# Patient Record
Sex: FEMALE | Race: WHITE | Employment: FULL TIME | URBAN - METROPOLITAN AREA
[De-identification: names, ages, dates, MRNs, and addresses within clinical notes are randomized per-mention and may not be internally consistent; named-entity substitution may affect disease eponyms.]

---

## 2020-11-15 ENCOUNTER — HOSPITAL ENCOUNTER (EMERGENCY)
Facility: CLINIC | Age: 20
Discharge: HOME | End: 2020-11-15
Attending: EMERGENCY MEDICINE | Admitting: EMERGENCY MEDICINE
Payer: COMMERCIAL

## 2020-11-15 VITALS
DIASTOLIC BLOOD PRESSURE: 66 MMHG | HEIGHT: 67 IN | WEIGHT: 120 LBS | HEART RATE: 87 BPM | TEMPERATURE: 97.3 F | RESPIRATION RATE: 14 BRPM | SYSTOLIC BLOOD PRESSURE: 111 MMHG | OXYGEN SATURATION: 98 % | BODY MASS INDEX: 18.83 KG/M2

## 2020-11-15 DIAGNOSIS — T78.40XA ALLERGIC REACTION, INITIAL ENCOUNTER: ICD-10-CM

## 2020-11-15 PROCEDURE — 250N000009 HC RX 250

## 2020-11-15 PROCEDURE — 250N000011 HC RX IP 250 OP 636

## 2020-11-15 PROCEDURE — 96374 THER/PROPH/DIAG INJ IV PUSH: CPT

## 2020-11-15 PROCEDURE — 250N000009 HC RX 250: Performed by: EMERGENCY MEDICINE

## 2020-11-15 PROCEDURE — 99284 EMERGENCY DEPT VISIT MOD MDM: CPT | Mod: 25

## 2020-11-15 RX ORDER — DIPHENHYDRAMINE HYDROCHLORIDE 50 MG/ML
INJECTION INTRAMUSCULAR; INTRAVENOUS
Status: COMPLETED
Start: 2020-11-15 | End: 2020-11-15

## 2020-11-15 RX ORDER — PREDNISONE 20 MG/1
TABLET ORAL
Qty: 10 TABLET | Refills: 0 | Status: SHIPPED | OUTPATIENT
Start: 2020-11-15

## 2020-11-15 RX ORDER — METHYLPREDNISOLONE SODIUM SUCCINATE 125 MG/2ML
INJECTION, POWDER, LYOPHILIZED, FOR SOLUTION INTRAMUSCULAR; INTRAVENOUS
Status: COMPLETED
Start: 2020-11-15 | End: 2020-11-15

## 2020-11-15 RX ADMIN — METHYLPREDNISOLONE SODIUM SUCCINATE 125 MG: 125 INJECTION, POWDER, FOR SOLUTION INTRAMUSCULAR; INTRAVENOUS at 20:09

## 2020-11-15 RX ADMIN — DIPHENHYDRAMINE HYDROCHLORIDE 25 MG: 50 INJECTION, SOLUTION INTRAMUSCULAR; INTRAVENOUS at 20:02

## 2020-11-15 RX ADMIN — FAMOTIDINE 20 MG: 10 INJECTION INTRAVENOUS at 20:16

## 2020-11-15 RX ADMIN — EPINEPHRINE 1 MG: 1 INJECTION INTRAMUSCULAR; INTRAVENOUS; SUBCUTANEOUS at 19:55

## 2020-11-15 ASSESSMENT — ENCOUNTER SYMPTOMS
SORE THROAT: 1
FEVER: 0
FACIAL SWELLING: 1
VOMITING: 1
COUGH: 0
CHILLS: 0
TROUBLE SWALLOWING: 1
NAUSEA: 1

## 2020-11-15 ASSESSMENT — MIFFLIN-ST. JEOR: SCORE: 1336.95

## 2020-11-15 NOTE — ED AVS SNAPSHOT
[facility] Emergency Dept  [address] [place] [address]  Phone: [phone]  Fax: [phone]                                    Kaitlyn Arroyo   MRN: [id number]    Department: [facility] Emergency Dept   Date of Visit: 11/15/2020           After Visit Summary Signature Page    I have received my discharge instructions, and my questions have been answered. I have discussed any challenges I see with this plan with the nurse or doctor.    ..........................................................................................................................................  Patient/Patient Representative Signature      ..........................................................................................................................................  Patient Representative Print Name and Relationship to Patient    ..................................................               ................................................  Date                                   Time    ..........................................................................................................................................  Reviewed by Signature/Title    ...................................................              ..............................................  Date                                               Time          22EPIC Rev 08/18

## 2020-11-16 NOTE — DISCHARGE INSTRUCTIONS
Please come back to emergency room immediately if you have any worsening swelling or signs of an allergic reaction.  Please do follow with your regular doctor as above.

## 2020-11-16 NOTE — ED TRIAGE NOTES
Patient states ate at an Asian restaurant.  After had throat tightness, lip swelling.  Vomited once.  Peanut allergy.    Mom gave her Children's Benadryl (oral solution).

## 2020-11-16 NOTE — ED PROVIDER NOTES
History     Chief Complaint:  Allergic Reaction     The history is provided by the patient.     Kaitlyn Arroyo is a 20 year old year old female with a known peanut allergy who presents with her boyfriend for evaluation of an allergic reaction. About 30 minutes prior to arrival, the patient developed throat tightness and itchiness in addition to lip swelling after eating Asian takeout food believed to contain peanuts from [employer]. Patient reports one episode of emesis. She states she Children's Benadryl after the onset of her symptoms, though is uncertain on the amount. Patient notes she has needed to receive epinephrine two other times in the past. Patient denies any fever, chills, cough, or any other symptoms.    Allergies:  Peanuts [Nuts]    Medications:   Epinephrine    Medical History:   The patient denies any significant past medical history.    Surgical History   The patient does not have any pertinent past surgical history.    Family History:   No past pertinent family history.    Social History:  Smoking status: Negative  Alcohol use: Positive  Drug use: Negative  Marital Status: Single  Presents to the ED with her boyfriend.   PCP: Ludy Paula     Review of Systems   Constitutional: Negative for chills and fever.   HENT: Positive for facial swelling (lips), sore throat (tightness) and trouble swallowing.    Respiratory: Negative for cough.    Gastrointestinal: Positive for nausea and vomiting.   All other systems reviewed and are negative.      Physical Exam     Patient Vitals for the past 24 hrs:   BP Temp Temp src Pulse Resp SpO2 Height Weight   11/15/20 2200 -- -- -- -- -- 98 % -- --   11/15/20 2145 -- -- -- -- -- 97 % -- --   11/15/20 2130 -- -- -- 87 -- 96 % -- --   11/15/20 2115 -- -- -- 89 -- 96 % -- --   11/15/20 2100 117/68 -- -- 86 -- 97 % -- --   11/15/20 2045 116/65 -- -- 89 -- 100 % -- --   11/15/20 2030 111/68 -- -- 89 -- 100 % -- --   11/15/20 1957 (!) 131/93 97.3  F (36.3  C)  "Temporal 97 14 100 % 1.702 m (5' 7\") 54.4 kg (120 lb)   11/15/20 1955 -- 97.6  F (36.4  C) Temporal 98 20 100 % -- --       Physical Exam  Vitals: reviewed by me  General: Pt seen on hospital [facility], pleasant, cooperative, and alert to conversation  Eyes: Tracking well, clear conjunctiva BL  ENT: MMM, midline trachea.  Slight swelling noted to the upper lip, oropharynx unremarkable, midline uvula, nonswollen.  No lesions noted.  No stridor, no evidence of airway impingement.  Airway appears widely patent.  Lungs:  No tachypnea, no accessory muscle use. No respiratory distress.   CV: Rate as above, regular rhythm.    Abd: Soft, non tender, no guarding, no rebound. Non distended  MSK: no peripheral edema or joint effusion.  No evidence of trauma  Skin: No rash, normal turgor and temperature  Neuro: Clear speech and no facial droop.  Psych: Not RIS, no e/o AH/VH      Emergency Department Course   Interventions:  1955 Epinephrine 1 mg IM  2002 Benadryl 25 mg IV  2009 Solu-Medrol 125 mg IV  2016 Pepcid 20 mg IV    Emergency Department Course:  Past medical records, nursing notes, and vitals reviewed.    7:53 PM I performed an exam of the patient as documented above.     2158 I rechecked the patient and discussed the results of her workup thus far.     Findings and plan explained to the Patient and significant other. Patient discharged home with instructions regarding supportive care, medications, and reasons to return. The importance of close follow-up was reviewed. The patient was prescribed Prednisone.    I personally answered all related questions prior to discharge.     Impression & Plan   Medical Decision Making:  Kaitlyn Arroyo is a 20 year old female who presents today with what appears to be an allergic reaction after taking in some type of peanut sauce at an Asian restaurant tonight. She did have some upper lip swelling and this was her only objective exam finding. She also noted some tingling and swelling in " her throat. Thankfully, all of this has been resolved with one dose of epi, steroids, Benadryl, and Pepcid. She does have an epi pen at home and feels comfortable going home with a course of steroids. We went over the signs and symptoms of a repeat anaphylaxis presentation and she knows to come back to the ED immediately with any worsening symptoms or if her other symptoms return to any degree. Male friend at bedside is also okay with this plan.     Critical care time 35 minutes.    Diagnosis:    ICD-10-CM    1. Allergic reaction, initial encounter  T78.40XA        Disposition:  Discharged to home.    Discharge Medications:  New Prescriptions    PREDNISONE (DELTASONE) 20 MG TABLET    Take two tablets (= 40mg) each day for 5 (five) days       Scribe Disclosure:  I, Holley Wall, am serving as a scribe on 11/15/2020 at 7:53 PM to personally document services performed by Jey Meza MD, based on my observations and the provider's statements to me.      Jey Meza MD  11/15/20 1130

## 2023-07-26 ENCOUNTER — LAB REQUISITION (OUTPATIENT)
Dept: LAB | Facility: CLINIC | Age: 23
End: 2023-07-26

## 2023-07-26 DIAGNOSIS — Z11.3 ENCOUNTER FOR SCREENING FOR INFECTIONS WITH A PREDOMINANTLY SEXUAL MODE OF TRANSMISSION: ICD-10-CM

## 2023-07-26 DIAGNOSIS — Z01.419 ENCOUNTER FOR GYNECOLOGICAL EXAMINATION (GENERAL) (ROUTINE) WITHOUT ABNORMAL FINDINGS: ICD-10-CM

## 2023-07-26 PROCEDURE — G0145 SCR C/V CYTO,THINLAYER,RESCR: HCPCS | Performed by: ADVANCED PRACTICE MIDWIFE

## 2023-07-26 PROCEDURE — 87591 N.GONORRHOEAE DNA AMP PROB: CPT | Performed by: ADVANCED PRACTICE MIDWIFE

## 2023-07-26 PROCEDURE — 87491 CHLMYD TRACH DNA AMP PROBE: CPT | Performed by: ADVANCED PRACTICE MIDWIFE

## 2023-07-27 LAB
C TRACH DNA SPEC QL PROBE+SIG AMP: NEGATIVE
N GONORRHOEA DNA SPEC QL NAA+PROBE: NEGATIVE

## 2023-07-30 LAB
BKR LAB AP GYN ADEQUACY: NORMAL
BKR LAB AP GYN INTERPRETATION: NORMAL
BKR LAB AP HPV REFLEX: NO
BKR LAB AP LMP: NORMAL
BKR LAB AP PREVIOUS ABNL DX: NORMAL
BKR LAB AP PREVIOUS ABNORMAL: NORMAL
PATH REPORT.COMMENTS IMP SPEC: NORMAL
PATH REPORT.COMMENTS IMP SPEC: NORMAL
PATH REPORT.RELEVANT HX SPEC: NORMAL